# Patient Record
Sex: FEMALE | URBAN - METROPOLITAN AREA
[De-identification: names, ages, dates, MRNs, and addresses within clinical notes are randomized per-mention and may not be internally consistent; named-entity substitution may affect disease eponyms.]

---

## 2023-05-30 ENCOUNTER — APPOINTMENT (RX ONLY)
Dept: URBAN - METROPOLITAN AREA CLINIC 55 | Facility: CLINIC | Age: 48
Setting detail: DERMATOLOGY
End: 2023-05-30

## 2023-05-30 DIAGNOSIS — Z41.9 ENCOUNTER FOR PROCEDURE FOR PURPOSES OTHER THAN REMEDYING HEALTH STATE, UNSPECIFIED: ICD-10-CM

## 2023-05-30 PROCEDURE — ? COSMETIC CONSULTATION: GENERAL

## 2023-05-30 PROCEDURE — ? DYSPORT

## 2023-05-30 PROCEDURE — ? INVENTORY

## 2023-05-30 NOTE — PROCEDURE: DYSPORT
Show Right And Left Pupillary Line Units: No
L Brow Units: 0
Dilution (U/0.1 Cc): 10
Show Additional Area 3: Yes
Post-Care Instructions: Patient instructed to not lie down for 4 hours and limit physical activity for 24 hours.
Periorbital Skin Units: 20
Detail Level: Detailed
Additional Area 1 Location: lip
Glabellar Complex Units: 40
Show Inventory Tab: Show
Consent obtained. Risks include but not limited to lid/brow ptosis, bruising, swelling, diplopia, temporary effect, incomplete chemical denervation.

## 2023-06-16 ENCOUNTER — APPOINTMENT (RX ONLY)
Dept: URBAN - METROPOLITAN AREA CLINIC 55 | Facility: CLINIC | Age: 48
Setting detail: DERMATOLOGY
End: 2023-06-16

## 2023-06-16 DIAGNOSIS — Z41.9 ENCOUNTER FOR PROCEDURE FOR PURPOSES OTHER THAN REMEDYING HEALTH STATE, UNSPECIFIED: ICD-10-CM

## 2023-06-16 PROCEDURE — ? FILLERS

## 2023-06-16 PROCEDURE — ? DYSPORT

## 2023-06-16 PROCEDURE — ? INVENTORY

## 2023-06-16 NOTE — PROCEDURE: DYSPORT
Inferior Lateral Orbicularis Oculi Units: 0
Show Ucl Units: No
Show Anterior Platysmal Band Units: Yes
Show Inventory Tab: Show
Dilution (U/0.1 Cc): 10
Additional Area 1 Units: 5
Consent obtained. Risks include but not limited to lid/brow ptosis, bruising, swelling, diplopia, temporary effect, incomplete chemical denervation.
Post-Care Instructions: Patient instructed to not lie down for 4 hours and limit physical activity for 24 hours.
Detail Level: Detailed
Additional Area 1 Location: lip

## 2023-06-16 NOTE — PROCEDURE: FILLERS
Additional Area 1 Volume In Cc: 0
Anesthesia Volume In Cc: 0.5
Include Cannula Size?: 27G
Additional Anesthesia Volume In Cc: 6
Include Cannula Information In Note?: No
Detail Level: Detailed
Consent: Written consent obtained. Risks include but not limited to bruising, beading, irregular texture, ulceration, infection, allergic reaction, scar formation, incomplete augmentation, temporary nature, and procedural pain.
Post-Care Instructions: After the procedure, patient instructed to apply ice to reduce swelling.
Filler: Restylane Kysse
Map Statment: See Attach Map for Complete Details
Filler: Delvis Santiago
Aspiration Statement: Aspiration was performed prior to injecting site with filler.
Anesthesia Type: 1% lidocaine with epinephrine
Include Cannula Information In Note?: Yes

## 2023-06-30 ENCOUNTER — APPOINTMENT (RX ONLY)
Dept: URBAN - METROPOLITAN AREA CLINIC 55 | Facility: CLINIC | Age: 48
Setting detail: DERMATOLOGY
End: 2023-06-30

## 2023-06-30 DIAGNOSIS — Z41.9 ENCOUNTER FOR PROCEDURE FOR PURPOSES OTHER THAN REMEDYING HEALTH STATE, UNSPECIFIED: ICD-10-CM

## 2023-06-30 PROCEDURE — ? INVENTORY

## 2023-06-30 PROCEDURE — ? FILLERS

## 2023-06-30 NOTE — PROCEDURE: FILLERS
Marionette Lines Filler Volume In Cc: 0
Include Cannula Information In Note?: No
Map Statment: See Attach Map for Complete Details
Include Cannula Size?: 27G
Additional Anesthesia Volume In Cc: 6
Aspiration Statement: Aspiration was performed prior to injecting site with filler.
Include Cannula Information In Note?: Yes
Filler: Restylane Kysse
Detail Level: Detailed
Consent: Written consent obtained. Risks include but not limited to bruising, beading, irregular texture, ulceration, infection, allergic reaction, scar formation, incomplete augmentation, temporary nature, and procedural pain.
Post-Care Instructions: After the procedure, patient instructed to apply ice to reduce swelling.
Anesthesia Type: 1% lidocaine with epinephrine
Anesthesia Volume In Cc: 0.5

## 2024-10-02 ENCOUNTER — APPOINTMENT (RX ONLY)
Dept: URBAN - METROPOLITAN AREA CLINIC 55 | Facility: CLINIC | Age: 49
Setting detail: DERMATOLOGY
End: 2024-10-02

## 2024-10-02 DIAGNOSIS — Z41.9 ENCOUNTER FOR PROCEDURE FOR PURPOSES OTHER THAN REMEDYING HEALTH STATE, UNSPECIFIED: ICD-10-CM

## 2024-10-02 PROCEDURE — ? INVENTORY

## 2024-10-02 PROCEDURE — ? COSMETIC CONSULTATION: GENERAL

## 2024-10-25 ENCOUNTER — APPOINTMENT (RX ONLY)
Dept: URBAN - METROPOLITAN AREA CLINIC 55 | Facility: CLINIC | Age: 49
Setting detail: DERMATOLOGY
End: 2024-10-25

## 2024-10-25 DIAGNOSIS — Z41.9 ENCOUNTER FOR PROCEDURE FOR PURPOSES OTHER THAN REMEDYING HEALTH STATE, UNSPECIFIED: ICD-10-CM

## 2024-10-25 PROCEDURE — ? ULTHERAPY

## 2024-10-25 PROCEDURE — ? INVENTORY

## 2024-10-25 NOTE — PROCEDURE: ULTHERAPY
Anesthesia Volume In Cc: 0
Total Lines (Use Numbers Only, No Special Characters Or $): 076
Post-Procedures Photographs: Yes
Treatment Number (Optional): 1
Use Distraction Techniques In Note: No
Consent obtained, risks reviewed.
Detail Level: Zone
Anesthesia Type: 1% lidocaine with epinephrine
Post-Care Instructions: After care instructions provided to patient.

## 2025-05-21 ENCOUNTER — APPOINTMENT (OUTPATIENT)
Dept: URBAN - METROPOLITAN AREA CLINIC 55 | Facility: CLINIC | Age: 50
Setting detail: DERMATOLOGY
End: 2025-05-21

## 2025-05-21 DIAGNOSIS — Z41.9 ENCOUNTER FOR PROCEDURE FOR PURPOSES OTHER THAN REMEDYING HEALTH STATE, UNSPECIFIED: ICD-10-CM

## 2025-05-21 PROCEDURE — ? INVENTORY

## 2025-05-21 PROCEDURE — ? DYSPORT

## 2025-05-21 PROCEDURE — ? IN-HOUSE DISPENSING PHARMACY

## 2025-05-21 NOTE — PROCEDURE: IN-HOUSE DISPENSING PHARMACY
Product 78 Price/Unit (In Dollars): 0
Product 70 Unit Type: mg
Product 2 Unit Type: ml
Product 4 Refills: 2
Render Product Pricing In Note: Yes
Product 6 Amount/Unit (Numbers Only): 30
Name Of Product 3: Acne Triple Combination Gel
Product 7 Application Directions: Apply only as directed
Product 4 Application Directions: Apply nightly or as directed.
Product 5 Refills: 11
Name Of Product 4: Hydroquinone 8% Emulsion
Product 1 Application Directions: Apply nightly or as directed. Use SPF daily.
Detail Level: Zone
Name Of Product 7: Lidocaine 23% / Tetracaine 7% Cream
Name Of Product 2: Dapsone / Spironolactone Gel
Name Of Product 1: Anti-Aging Brightening Cream
Product 4 Units Dispensed: 1
Send Charges To Patient Encounter: No
Name Of Product 5: Hydrating Tretinoin 0.025% Cream
Name Of Product 6: Rosacea Triple Combination Gel

## 2025-05-21 NOTE — PROCEDURE: DYSPORT
Additional Area 1 Location: lip
Show Depressor Anguli Units: Yes
Show Ucl Units: No
Lcl Root Units: 0
Forehead Units: 10
Show Inventory Tab: Show
Glabellar Complex Units: 40
Additional Area 2 Location: DLI
Consent obtained. Risks include but not limited to lid/brow ptosis, bruising, swelling, diplopia, temporary effect, incomplete chemical denervation.
Periorbital Skin Units: 20
Additional Area 3 Location: Nasalis
Post-Care Instructions: Patient instructed to not lie down for 4 hours and limit physical activity for 24 hours.
Detail Level: Detailed

## 2025-06-04 ENCOUNTER — APPOINTMENT (OUTPATIENT)
Dept: URBAN - METROPOLITAN AREA CLINIC 55 | Facility: CLINIC | Age: 50
Setting detail: DERMATOLOGY
End: 2025-06-04

## 2025-06-04 DIAGNOSIS — Z41.9 ENCOUNTER FOR PROCEDURE FOR PURPOSES OTHER THAN REMEDYING HEALTH STATE, UNSPECIFIED: ICD-10-CM

## 2025-06-04 PROCEDURE — ? SCULPTRA

## 2025-06-04 PROCEDURE — ? FILLERS

## 2025-06-04 PROCEDURE — ? INVENTORY

## 2025-06-04 NOTE — PROCEDURE: SCULPTRA
Injection Technique: The Sculptra was injected to the listed areas after cleansing the skin and providing appropriate anesthesia.
Right Lateral Face Filler Volume In Cc: 0
Map Statement: See Attached Map for Complete Details.
Show Dorsal Hands Volume?: Yes
Consent was obtained.
Show Right And Left Mmc Volume?: No
Detail Level: Detailed
Additional Anesthesia Volume In Cc: 6
Show Inventory Tab: Hide
Post-Care Instructions: Patient instructed to apply ice to reduce swelling.
Anesthesia Type: 1% lidocaine with epinephrine
Vials Reconstituted (Required For Inventory): 1
Dilution Method: The Sculptra was diluted with 8 ml of sterile water and 2 ml 1% lidocaine for a total volume of 10ccs for each vial.
Anesthesia Volume In Cc: 0.5

## 2025-06-04 NOTE — PROCEDURE: FILLERS
Anesthesia Type: 1% lidocaine with epinephrine
Cheeks Filler Volume In Cc: 0
Include Cannula Information In Note?: Yes
Include Cannula Information In Note?: No
Consent: Written consent obtained. Risks include but not limited to bruising, beading, irregular texture, ulceration, infection, allergic reaction, scar formation, incomplete augmentation, temporary nature, and procedural pain.
Aspiration Statement: Aspiration was performed prior to injecting site with filler.
Anesthesia Volume In Cc: 0.5
Post-Care Instructions: After the procedure, patient instructed to apply ice to reduce swelling.
Include Cannula Size?: 27G
Additional Anesthesia Volume In Cc: 6
Detail Level: Detailed
Include Cannula Size?: 25G
Filler: Dlevis Santiago
Map Statment: See Attach Map for Complete Details

## 2025-07-30 ENCOUNTER — APPOINTMENT (OUTPATIENT)
Dept: URBAN - METROPOLITAN AREA CLINIC 55 | Facility: CLINIC | Age: 50
Setting detail: DERMATOLOGY
End: 2025-07-30

## 2025-07-30 DIAGNOSIS — Z41.9 ENCOUNTER FOR PROCEDURE FOR PURPOSES OTHER THAN REMEDYING HEALTH STATE, UNSPECIFIED: ICD-10-CM

## 2025-07-30 PROCEDURE — ? SCULPTRA

## 2025-07-30 PROCEDURE — ? INVENTORY

## 2025-07-30 NOTE — PROCEDURE: SCULPTRA
Show Temples Volume?: Yes
Anesthesia Volume In Cc: 0.5
Show Right And Left Jawline Volume?: No
Treatment Number: 0
Injection Technique: The Sculptra was injected to the listed areas after cleansing the skin and providing appropriate anesthesia.
Consent was obtained.
Show Inventory Tab: Hide
Additional Anesthesia Volume In Cc: 6
Post-Care Instructions: Patient instructed to apply ice to reduce swelling.
Vials Reconstituted (Required For Inventory): 1
Anesthesia Type: 1% lidocaine with epinephrine
Detail Level: Detailed
Dilution Method: The Sculptra was diluted with 8 ml of sterile water and 2 ml 1% lidocaine for a total volume of 10ccs for each vial.
Map Statement: See Attached Map for Complete Details.